# Patient Record
Sex: FEMALE | Race: BLACK OR AFRICAN AMERICAN | Employment: UNEMPLOYED | URBAN - METROPOLITAN AREA
[De-identification: names, ages, dates, MRNs, and addresses within clinical notes are randomized per-mention and may not be internally consistent; named-entity substitution may affect disease eponyms.]

---

## 2017-10-27 ENCOUNTER — HOSPITAL ENCOUNTER (EMERGENCY)
Age: 37
Discharge: HOME | End: 2017-10-27
Attending: EMERGENCY MEDICINE
Payer: MEDICAID

## 2017-10-27 VITALS
HEIGHT: 62 IN | DIASTOLIC BLOOD PRESSURE: 88 MMHG | SYSTOLIC BLOOD PRESSURE: 164 MMHG | OXYGEN SATURATION: 98 % | RESPIRATION RATE: 16 BRPM | BODY MASS INDEX: 20.57 KG/M2 | HEART RATE: 96 BPM | TEMPERATURE: 99 F | WEIGHT: 111.77 LBS

## 2017-10-27 DIAGNOSIS — R20.2 PARESTHESIA: Primary | ICD-10-CM

## 2017-10-27 PROCEDURE — 99281 EMR DPT VST MAYX REQ PHY/QHP: CPT

## 2017-10-27 NOTE — ED TRIAGE NOTES
Pt reports she woke up with left sided arm numbness this morning and started with left facial numbness at 0700 am today that has been resolved and lasted approx 1-2 minutes.  Dr. Jeffrey Jim states he will see patient first before calling a CODE S.

## 2017-10-27 NOTE — DISCHARGE INSTRUCTIONS
Numbness and Tingling: Care Instructions  Your Care Instructions    Many things can cause numbness or tingling. Swelling may put pressure on a nerve. This could cause you to lose feeling or have a pins-and-needles sensation on part of your body. Nerves may be damaged from trauma, toxins, or diseases, such as diabetes or multiple sclerosis (MS). Sometimes, though, the cause is not clear. If there is no clear reason for your symptoms, and you are not having any other symptoms, your doctor may suggest watching and waiting for a while to see if the numbness or tingling goes away on its own. Your doctor may want you to have blood or nerve tests to find the cause of your symptoms. Follow-up care is a key part of your treatment and safety. Be sure to make and go to all appointments, and call your doctor if you are having problems. It's also a good idea to know your test results and keep a list of the medicines you take. How can you care for yourself at home? · If your doctor prescribes medicine, take it exactly as directed. Call your doctor if you think you are having a problem with your medicine. · If you have any swelling, put ice or a cold pack on the area for 10 to 20 minutes at a time. Put a thin cloth between the ice and your skin. When should you call for help? Call 911 anytime you think you may need emergency care. For example, call if:  ? · You have weakness, numbness, or tingling in both legs. ? · You lose bowel or bladder control. ? · You have symptoms of a stroke. These may include:  ¨ Sudden numbness, tingling, weakness, or loss of movement in your face, arm, or leg, especially on only one side of your body. ¨ Sudden vision changes. ¨ Sudden trouble speaking. ¨ Sudden confusion or trouble understanding simple statements. ¨ Sudden problems with walking or balance. ¨ A sudden, severe headache that is different from past headaches. ? Watch closely for changes in your health, and be sure to contact your doctor if you have any problems, or if:  ? · You do not get better as expected. Where can you learn more? Go to http://michele-mikayla.info/. Enter N856 in the search box to learn more about \"Numbness and Tingling: Care Instructions. \"  Current as of: October 14, 2016  Content Version: 11.4  © 3943-2877 [employer]. Care instructions adapted under license by [employer] (which disclaims liability or warranty for this information). If you have questions about a medical condition or this instruction, always ask your healthcare professional. [place] [address] any warranty or liability for your use of this information.

## 2017-10-27 NOTE — ED PROVIDER NOTES
HPI Comments: 42 y.o. female with past medical history significant for cough, GERD, postpartum depression, anemia, herpes simplex, preeclampsia, and asthma who presents to the ED with chief complaint of numbness. Patient reports left arm numbness that lasted for ~1-2 minutes with onset at ~0700 \"this morning,\" reports waking up with her symptoms. Patient reports developing left-sided facial numbness and tongue numbness after waking up \"this morning. \" Patient reports her symptoms have improved since onset. Patient reports visual changes to her right upper field of vision in her bilateral eyes ~2 days ago; describes her visual changes as \"like a kaleidoscope. \" Patient reports a right-sided headache that lasted for ~3-4 hours following the onset of her visual changes. Patient reports checking her blood pressure at that time; reports it was \"583\" systolic. Patient reports her systolic blood pressure improved to \"124\" after drinking a cup of herbal tea. Patient reports using herbal tumeric regularly for low TSH levels. Patient reports giving birth \"last year,\" reports nursing her child regularly. Patient reports a history of HTN during her pregnancy. Patient reports a history of migraines. Patient reports a history of being born with a heart murmur; reports it has since resolved. Patient denies any recent travel. Patient denies weakness, speech difficulty, and walking difficulty. There are no other acute medical concerns at this time. PCP: Arcelia Damon MD    Note written by Lynn Coats, as dictated by Dipti Foley MD 8:45 AM   The history is provided by the patient. Past Medical History:   Diagnosis Date    Abnormal Pap smear 2006    HPV +, cleared on subsequent pap    Abnormal Papanicolaou smear of cervix     2006. Repap was normal.    Anemia     takes iron    Asthma     related to GERD. Does not take an inhaler daily.     Cough     GERD (gastroesophageal reflux disease)     Herpes simplex without mention of complication     Postpartum depression     Didn't take her zoloft    Preeclampsia     with all previous pregnancies       Past Surgical History:   Procedure Laterality Date    HX DILATION AND CURETTAGE  2011    HX DILATION AND CURETTAGE  2011    following miscarriage    HX OTHER SURGICAL      D&C in 2011         Family History:   Problem Relation Age of Onset    Diabetes Mother     Hypertension Mother     Hypertension Father        Social History     Social History    Marital status:      Spouse name: N/A    Number of children: N/A    Years of education: N/A     Occupational History    Not on file. Social History Main Topics    Smoking status: Never Smoker    Smokeless tobacco: Never Used    Alcohol use No    Drug use: No    Sexual activity: Yes     Partners: Male     Other Topics Concern    Not on file     Social History Narrative         ALLERGIES: Latex and Latex, natural rubber    Review of Systems   Constitutional: Negative. Negative for appetite change, fever and unexpected weight change. HENT: Negative. Negative for ear pain, hearing loss, nosebleeds, rhinorrhea, sore throat and trouble swallowing. Eyes: Positive for visual disturbance. Respiratory: Negative. Negative for cough, chest tightness and shortness of breath. Cardiovascular: Negative. Negative for chest pain and palpitations. Gastrointestinal: Negative. Negative for abdominal distention, abdominal pain, blood in stool and vomiting. Endocrine: Negative. Genitourinary: Negative for dysuria and hematuria. Musculoskeletal: Negative. Negative for back pain and myalgias. Skin: Negative. Negative for rash. Allergic/Immunologic: Negative. Neurological: Positive for numbness and headaches. Negative for dizziness, syncope and weakness. Hematological: Negative. Psychiatric/Behavioral: Negative. All other systems reviewed and are negative.       Vitals:    10/27/17 0830   BP: 164/88   Pulse: 96   Resp: 16   Temp: 99 °F (37.2 °C)   SpO2: 98%   Weight: 50.7 kg (111 lb 12.4 oz)   Height: 5' 2\" (1.575 m)            Physical Exam   Constitutional: She is oriented to person, place, and time. She appears well-developed and well-nourished. No distress. HENT:   Head: Normocephalic and atraumatic. Right Ear: External ear normal.   Left Ear: External ear normal.   Nose: Nose normal.   Mouth/Throat: Oropharynx is clear and moist.   Eyes: Conjunctivae and EOM are normal. Pupils are equal, round, and reactive to light. Neck: Normal range of motion. Neck supple. No JVD present. No thyromegaly present. Cardiovascular: Normal rate, regular rhythm, normal heart sounds and intact distal pulses. No murmur heard. Pulmonary/Chest: Effort normal and breath sounds normal. No respiratory distress. She has no wheezes. She has no rales. Abdominal: Soft. Bowel sounds are normal. She exhibits no distension. There is no tenderness. Musculoskeletal: Normal range of motion. She exhibits no edema. Neurological: She is alert and oriented to person, place, and time. No cranial nerve deficit. Skin: Skin is warm and dry. No rash noted. Psychiatric: She has a normal mood and affect. Her behavior is normal. Thought content normal.   Note written by Lynn Mendoza, as dictated by Arya Armendariz MD 8:48 AM     MDM  Number of Diagnoses or Management Options  Paresthesia:   Diagnosis management comments: Resolved paraesthesia. No suspicion for central neurologic process, TIA, or CVA. Will discharge home with PCP follow up as needed and instructions to return if symptoms worsen.      ED Course       Procedures

## 2022-01-28 LAB
CHLAMYDIA, EXTERNAL: NEGATIVE
N. GONORRHEA, EXTERNAL: NEGATIVE

## 2022-02-24 LAB
ANTIBODY SCREEN, EXTERNAL: NEGATIVE
HBSAG, EXTERNAL: NEGATIVE
HIV, EXTERNAL: NEGATIVE
RPR, EXTERNAL: NORMAL
RUBELLA, EXTERNAL: NORMAL
TYPE, ABO & RH, EXTERNAL: NORMAL

## 2022-05-03 ENCOUNTER — HOSPITAL ENCOUNTER (OUTPATIENT)
Dept: PERINATAL CARE | Age: 42
Discharge: HOME | End: 2022-05-03
Attending: OBSTETRICS & GYNECOLOGY
Payer: MEDICAID

## 2022-05-03 PROCEDURE — 76811 OB US DETAILED SNGL FETUS: CPT | Performed by: OBSTETRICS & GYNECOLOGY

## 2022-06-14 ENCOUNTER — HOSPITAL ENCOUNTER (OUTPATIENT)
Dept: PERINATAL CARE | Age: 42
Discharge: HOME | End: 2022-06-14
Attending: OBSTETRICS & GYNECOLOGY
Payer: MEDICAID

## 2022-06-14 PROCEDURE — 76816 OB US FOLLOW-UP PER FETUS: CPT | Performed by: OBSTETRICS & GYNECOLOGY

## 2022-08-01 ENCOUNTER — HOSPITAL ENCOUNTER (OUTPATIENT)
Dept: PERINATAL CARE | Age: 42
Discharge: HOME | End: 2022-08-01
Attending: OBSTETRICS & GYNECOLOGY
Payer: MEDICAID

## 2022-08-01 PROCEDURE — 76816 OB US FOLLOW-UP PER FETUS: CPT | Performed by: OBSTETRICS & GYNECOLOGY

## 2022-08-11 LAB — GRBS, EXTERNAL: NEGATIVE

## 2022-08-17 ENCOUNTER — HOSPITAL ENCOUNTER (EMERGENCY)
Age: 42
Discharge: LEFT BEFORE DISCHARGE | End: 2022-08-17
Payer: MEDICAID

## 2022-08-17 PROCEDURE — 75810000275 HC EMERGENCY DEPT VISIT NO LEVEL OF CARE

## 2022-08-18 ENCOUNTER — ANESTHESIA (OUTPATIENT)
Dept: LABOR AND DELIVERY | Age: 42
DRG: 560 | End: 2022-08-18
Payer: MEDICAID

## 2022-08-18 ENCOUNTER — HOSPITAL ENCOUNTER (INPATIENT)
Age: 42
LOS: 2 days | Discharge: HOME | DRG: 560 | End: 2022-08-20
Attending: OBSTETRICS & GYNECOLOGY | Admitting: OBSTETRICS & GYNECOLOGY
Payer: MEDICAID

## 2022-08-18 ENCOUNTER — ANESTHESIA EVENT (OUTPATIENT)
Dept: LABOR AND DELIVERY | Age: 42
DRG: 560 | End: 2022-08-18
Payer: MEDICAID

## 2022-08-18 PROBLEM — Z3A.37 37 WEEKS GESTATION OF PREGNANCY: Status: ACTIVE | Noted: 2022-08-18

## 2022-08-18 LAB
ABO + RH BLD: NORMAL
ALBUMIN SERPL-MCNC: 2.6 G/DL (ref 3.5–5)
ALBUMIN/GLOB SERPL: 0.8 {RATIO} (ref 1.1–2.2)
ALP SERPL-CCNC: 162 U/L (ref 45–117)
ALT SERPL-CCNC: 20 U/L (ref 12–78)
ANION GAP SERPL CALC-SCNC: 8 MMOL/L (ref 5–15)
AST SERPL-CCNC: 21 U/L (ref 15–37)
BILIRUB SERPL-MCNC: 0.4 MG/DL (ref 0.2–1)
BLOOD GROUP ANTIBODIES SERPL: NORMAL
BUN SERPL-MCNC: 6 MG/DL (ref 6–20)
BUN/CREAT SERPL: 11 (ref 12–20)
CALCIUM SERPL-MCNC: 8.5 MG/DL (ref 8.5–10.1)
CHLORIDE SERPL-SCNC: 111 MMOL/L (ref 97–108)
CO2 SERPL-SCNC: 21 MMOL/L (ref 21–32)
CREAT SERPL-MCNC: 0.57 MG/DL (ref 0.55–1.02)
CREAT UR-MCNC: 47 MG/DL
ERYTHROCYTE [DISTWIDTH] IN BLOOD BY AUTOMATED COUNT: 15 % (ref 11.5–14.5)
GLOBULIN SER CALC-MCNC: 3.1 G/DL (ref 2–4)
GLUCOSE SERPL-MCNC: 68 MG/DL (ref 65–100)
HCT VFR BLD AUTO: 33.6 % (ref 35–47)
HGB BLD-MCNC: 10.6 G/DL (ref 11.5–16)
LDH SERPL L TO P-CCNC: 179 U/L (ref 81–246)
MCH RBC QN AUTO: 24.7 PG (ref 26–34)
MCHC RBC AUTO-ENTMCNC: 31.5 G/DL (ref 30–36.5)
MCV RBC AUTO: 78.1 FL (ref 80–99)
NRBC # BLD: 0 K/UL (ref 0–0.01)
NRBC BLD-RTO: 0 PER 100 WBC
PLATELET # BLD AUTO: 160 K/UL (ref 150–400)
PMV BLD AUTO: 11.4 FL (ref 8.9–12.9)
POTASSIUM SERPL-SCNC: 3.9 MMOL/L (ref 3.5–5.1)
PROT SERPL-MCNC: 5.7 G/DL (ref 6.4–8.2)
PROT UR-MCNC: 30 MG/DL (ref 0–11.9)
PROT/CREAT UR-RTO: 0.6
RBC # BLD AUTO: 4.3 M/UL (ref 3.8–5.2)
SODIUM SERPL-SCNC: 140 MMOL/L (ref 136–145)
SPECIMEN EXP DATE BLD: NORMAL
WBC # BLD AUTO: 8 K/UL (ref 3.6–11)

## 2022-08-18 PROCEDURE — 75410000003 HC RECOV DEL/VAG/CSECN EA 0.5 HR: Performed by: STUDENT IN AN ORGANIZED HEALTH CARE EDUCATION/TRAINING PROGRAM

## 2022-08-18 PROCEDURE — 75410000000 HC DELIVERY VAGINAL/SINGLE: Performed by: STUDENT IN AN ORGANIZED HEALTH CARE EDUCATION/TRAINING PROGRAM

## 2022-08-18 PROCEDURE — 74011250637 HC RX REV CODE- 250/637: Performed by: OBSTETRICS & GYNECOLOGY

## 2022-08-18 PROCEDURE — 84156 ASSAY OF PROTEIN URINE: CPT

## 2022-08-18 PROCEDURE — 36415 COLL VENOUS BLD VENIPUNCTURE: CPT

## 2022-08-18 PROCEDURE — 85027 COMPLETE CBC AUTOMATED: CPT

## 2022-08-18 PROCEDURE — 80053 COMPREHEN METABOLIC PANEL: CPT

## 2022-08-18 PROCEDURE — 74011000250 HC RX REV CODE- 250: Performed by: ANESTHESIOLOGY

## 2022-08-18 PROCEDURE — 77030014125 HC TY EPDRL BBMI -B: Performed by: ANESTHESIOLOGY

## 2022-08-18 PROCEDURE — 74011250636 HC RX REV CODE- 250/636: Performed by: OBSTETRICS & GYNECOLOGY

## 2022-08-18 PROCEDURE — 74011250636 HC RX REV CODE- 250/636: Performed by: ANESTHESIOLOGY

## 2022-08-18 PROCEDURE — 83615 LACTATE (LD) (LDH) ENZYME: CPT

## 2022-08-18 PROCEDURE — 75810000275 HC EMERGENCY DEPT VISIT NO LEVEL OF CARE

## 2022-08-18 PROCEDURE — 77030005513 HC CATH URETH FOL11 MDII -B

## 2022-08-18 PROCEDURE — 86900 BLOOD TYPING SEROLOGIC ABO: CPT

## 2022-08-18 PROCEDURE — 75410000002 HC LABOR FEE PER 1 HR: Performed by: STUDENT IN AN ORGANIZED HEALTH CARE EDUCATION/TRAINING PROGRAM

## 2022-08-18 PROCEDURE — 74011250637 HC RX REV CODE- 250/637: Performed by: STUDENT IN AN ORGANIZED HEALTH CARE EDUCATION/TRAINING PROGRAM

## 2022-08-18 PROCEDURE — 65410000002 HC RM PRIVATE OB

## 2022-08-18 PROCEDURE — 76060000078 HC EPIDURAL ANESTHESIA: Performed by: ANESTHESIOLOGY

## 2022-08-18 PROCEDURE — 74011250636 HC RX REV CODE- 250/636

## 2022-08-18 PROCEDURE — 4A1HXCZ MONITORING OF PRODUCTS OF CONCEPTION, CARDIAC RATE, EXTERNAL APPROACH: ICD-10-PCS | Performed by: STUDENT IN AN ORGANIZED HEALTH CARE EDUCATION/TRAINING PROGRAM

## 2022-08-18 PROCEDURE — 2709999900 HC NON-CHARGEABLE SUPPLY

## 2022-08-18 RX ORDER — NALBUPHINE HYDROCHLORIDE 10 MG/ML
10 INJECTION, SOLUTION INTRAMUSCULAR; INTRAVENOUS; SUBCUTANEOUS
Status: DISCONTINUED | OUTPATIENT
Start: 2022-08-18 | End: 2022-08-19 | Stop reason: HOSPADM

## 2022-08-18 RX ORDER — IBUPROFEN 800 MG/1
800 TABLET ORAL
Status: DISCONTINUED | OUTPATIENT
Start: 2022-08-18 | End: 2022-08-20 | Stop reason: HOSPADM

## 2022-08-18 RX ORDER — NIFEDIPINE 30 MG/1
30 TABLET, EXTENDED RELEASE ORAL
Status: COMPLETED | OUTPATIENT
Start: 2022-08-18 | End: 2022-08-18

## 2022-08-18 RX ORDER — MAGNESIUM SULFATE HEPTAHYDRATE 40 MG/ML
2 INJECTION, SOLUTION INTRAVENOUS CONTINUOUS
Status: DISCONTINUED | OUTPATIENT
Start: 2022-08-18 | End: 2022-08-19

## 2022-08-18 RX ORDER — GUAIFENESIN 100 MG/5ML
81 LIQUID (ML) ORAL DAILY
COMMUNITY
End: 2022-08-20

## 2022-08-18 RX ORDER — MAGNESIUM SULFATE HEPTAHYDRATE 40 MG/ML
2 INJECTION, SOLUTION INTRAVENOUS ONCE
Status: DISCONTINUED | OUTPATIENT
Start: 2022-08-18 | End: 2022-08-18

## 2022-08-18 RX ORDER — MAGNESIUM SULFATE HEPTAHYDRATE 40 MG/ML
INJECTION, SOLUTION INTRAVENOUS
Status: COMPLETED
Start: 2022-08-18 | End: 2022-08-18

## 2022-08-18 RX ORDER — MINERAL OIL
120 OIL (ML) ORAL ONCE
Status: ACTIVE | OUTPATIENT
Start: 2022-08-18 | End: 2022-08-18

## 2022-08-18 RX ORDER — ACETAMINOPHEN 325 MG/1
650 TABLET ORAL
Status: DISCONTINUED | OUTPATIENT
Start: 2022-08-18 | End: 2022-08-18

## 2022-08-18 RX ORDER — MAGNESIUM SULFATE HEPTAHYDRATE 40 MG/ML
4 INJECTION, SOLUTION INTRAVENOUS ONCE
Status: COMPLETED | OUTPATIENT
Start: 2022-08-18 | End: 2022-08-18

## 2022-08-18 RX ORDER — FENTANYL CITRATE 50 UG/ML
INJECTION, SOLUTION INTRAMUSCULAR; INTRAVENOUS AS NEEDED
Status: DISCONTINUED | OUTPATIENT
Start: 2022-08-18 | End: 2022-08-18 | Stop reason: HOSPADM

## 2022-08-18 RX ORDER — ZOLPIDEM TARTRATE 5 MG/1
5 TABLET ORAL
Status: DISCONTINUED | OUTPATIENT
Start: 2022-08-18 | End: 2022-08-20 | Stop reason: HOSPADM

## 2022-08-18 RX ORDER — FENTANYL/BUPIVACAINE/NS/PF 2-1250MCG
10 PREFILLED PUMP RESERVOIR EPIDURAL CONTINUOUS
Status: DISCONTINUED | OUTPATIENT
Start: 2022-08-18 | End: 2022-08-19

## 2022-08-18 RX ORDER — MAG HYDROX/ALUMINUM HYD/SIMETH 200-200-20
30 SUSPENSION, ORAL (FINAL DOSE FORM) ORAL
Status: DISCONTINUED | OUTPATIENT
Start: 2022-08-18 | End: 2022-08-19 | Stop reason: HOSPADM

## 2022-08-18 RX ORDER — CALCIUM CARBONATE 200(500)MG
400 TABLET,CHEWABLE ORAL
Status: DISCONTINUED | OUTPATIENT
Start: 2022-08-18 | End: 2022-08-19 | Stop reason: HOSPADM

## 2022-08-18 RX ORDER — HYDROCORTISONE ACETATE PRAMOXINE HCL 2.5; 1 G/100G; G/100G
CREAM TOPICAL AS NEEDED
Status: DISCONTINUED | OUTPATIENT
Start: 2022-08-18 | End: 2022-08-18 | Stop reason: CLARIF

## 2022-08-18 RX ORDER — ACETAMINOPHEN 325 MG/1
650 TABLET ORAL
Status: DISCONTINUED | OUTPATIENT
Start: 2022-08-18 | End: 2022-08-20 | Stop reason: HOSPADM

## 2022-08-18 RX ORDER — CALCIUM GLUCONATE 20 MG/ML
1 INJECTION, SOLUTION INTRAVENOUS ONCE
Status: ACTIVE | OUTPATIENT
Start: 2022-08-18 | End: 2022-08-18

## 2022-08-18 RX ORDER — OXYTOCIN/RINGER'S LACTATE 30/500 ML
0-20 PLASTIC BAG, INJECTION (ML) INTRAVENOUS
Status: DISCONTINUED | OUTPATIENT
Start: 2022-08-18 | End: 2022-08-19

## 2022-08-18 RX ORDER — OXYTOCIN/RINGER'S LACTATE 30/500 ML
87.3 PLASTIC BAG, INJECTION (ML) INTRAVENOUS AS NEEDED
Status: DISCONTINUED | OUTPATIENT
Start: 2022-08-18 | End: 2022-08-20 | Stop reason: HOSPADM

## 2022-08-18 RX ORDER — NALOXONE HYDROCHLORIDE 0.4 MG/ML
0.4 INJECTION, SOLUTION INTRAMUSCULAR; INTRAVENOUS; SUBCUTANEOUS AS NEEDED
Status: DISCONTINUED | OUTPATIENT
Start: 2022-08-18 | End: 2022-08-20 | Stop reason: HOSPADM

## 2022-08-18 RX ORDER — LIDOCAINE HYDROCHLORIDE AND EPINEPHRINE 15; 5 MG/ML; UG/ML
INJECTION, SOLUTION EPIDURAL AS NEEDED
Status: DISCONTINUED | OUTPATIENT
Start: 2022-08-18 | End: 2022-08-18 | Stop reason: HOSPADM

## 2022-08-18 RX ORDER — EPHEDRINE SULFATE/0.9% NACL/PF 50 MG/5 ML
10 SYRINGE (ML) INTRAVENOUS
Status: DISCONTINUED | OUTPATIENT
Start: 2022-08-18 | End: 2022-08-19 | Stop reason: HOSPADM

## 2022-08-18 RX ORDER — OXYTOCIN/RINGER'S LACTATE 30/500 ML
10 PLASTIC BAG, INJECTION (ML) INTRAVENOUS AS NEEDED
Status: DISCONTINUED | OUTPATIENT
Start: 2022-08-18 | End: 2022-08-20 | Stop reason: HOSPADM

## 2022-08-18 RX ORDER — HYDRALAZINE HYDROCHLORIDE 20 MG/ML
10 INJECTION INTRAMUSCULAR; INTRAVENOUS ONCE
Status: COMPLETED | OUTPATIENT
Start: 2022-08-19 | End: 2022-08-18

## 2022-08-18 RX ORDER — SODIUM CHLORIDE, SODIUM LACTATE, POTASSIUM CHLORIDE, CALCIUM CHLORIDE 600; 310; 30; 20 MG/100ML; MG/100ML; MG/100ML; MG/100ML
75 INJECTION, SOLUTION INTRAVENOUS CONTINUOUS
Status: DISCONTINUED | OUTPATIENT
Start: 2022-08-18 | End: 2022-08-19

## 2022-08-18 RX ORDER — ONDANSETRON 2 MG/ML
4 INJECTION INTRAMUSCULAR; INTRAVENOUS
Status: DISCONTINUED | OUTPATIENT
Start: 2022-08-18 | End: 2022-08-19 | Stop reason: HOSPADM

## 2022-08-18 RX ORDER — NIFEDIPINE 30 MG/1
30 TABLET, EXTENDED RELEASE ORAL EVERY 12 HOURS
Status: DISCONTINUED | OUTPATIENT
Start: 2022-08-18 | End: 2022-08-20 | Stop reason: HOSPADM

## 2022-08-18 RX ADMIN — MAGNESIUM SULFATE HEPTAHYDRATE 2 G/HR: 40 INJECTION, SOLUTION INTRAVENOUS at 20:28

## 2022-08-18 RX ADMIN — FENTANYL CITRATE 25 MCG: 50 INJECTION, SOLUTION INTRAMUSCULAR; INTRAVENOUS at 11:14

## 2022-08-18 RX ADMIN — SODIUM CHLORIDE, POTASSIUM CHLORIDE, SODIUM LACTATE AND CALCIUM CHLORIDE 125 ML/HR: 600; 310; 30; 20 INJECTION, SOLUTION INTRAVENOUS at 08:12

## 2022-08-18 RX ADMIN — SODIUM CHLORIDE, POTASSIUM CHLORIDE, SODIUM LACTATE AND CALCIUM CHLORIDE 999 ML/HR: 600; 310; 30; 20 INJECTION, SOLUTION INTRAVENOUS at 16:34

## 2022-08-18 RX ADMIN — NIFEDIPINE 30 MG: 30 TABLET, EXTENDED RELEASE ORAL at 08:21

## 2022-08-18 RX ADMIN — LIDOCAINE HYDROCHLORIDE AND EPINEPHRINE 3.5 ML: 15; 5 INJECTION, SOLUTION EPIDURAL at 11:21

## 2022-08-18 RX ADMIN — SODIUM CHLORIDE, POTASSIUM CHLORIDE, SODIUM LACTATE AND CALCIUM CHLORIDE 75 ML/HR: 600; 310; 30; 20 INJECTION, SOLUTION INTRAVENOUS at 15:05

## 2022-08-18 RX ADMIN — SODIUM CHLORIDE, POTASSIUM CHLORIDE, SODIUM LACTATE AND CALCIUM CHLORIDE 75 ML/HR: 600; 310; 30; 20 INJECTION, SOLUTION INTRAVENOUS at 11:21

## 2022-08-18 RX ADMIN — SODIUM CHLORIDE, POTASSIUM CHLORIDE, SODIUM LACTATE AND CALCIUM CHLORIDE 999 ML/HR: 600; 310; 30; 20 INJECTION, SOLUTION INTRAVENOUS at 10:20

## 2022-08-18 RX ADMIN — OXYTOCIN 4 MILLI-UNITS/MIN: 10 INJECTION, SOLUTION INTRAMUSCULAR; INTRAVENOUS at 09:11

## 2022-08-18 RX ADMIN — NIFEDIPINE 30 MG: 30 TABLET, EXTENDED RELEASE ORAL at 07:22

## 2022-08-18 RX ADMIN — SODIUM CHLORIDE, POTASSIUM CHLORIDE, SODIUM LACTATE AND CALCIUM CHLORIDE 999 ML/HR: 600; 310; 30; 20 INJECTION, SOLUTION INTRAVENOUS at 10:05

## 2022-08-18 RX ADMIN — Medication 10 ML/HR: at 19:16

## 2022-08-18 RX ADMIN — HYDRALAZINE HYDROCHLORIDE 10 MG: 20 INJECTION INTRAMUSCULAR; INTRAVENOUS at 23:53

## 2022-08-18 RX ADMIN — SODIUM CHLORIDE, POTASSIUM CHLORIDE, SODIUM LACTATE AND CALCIUM CHLORIDE 125 ML/HR: 600; 310; 30; 20 INJECTION, SOLUTION INTRAVENOUS at 23:58

## 2022-08-18 RX ADMIN — MAGNESIUM SULFATE HEPTAHYDRATE 4 G: 40 INJECTION, SOLUTION INTRAVENOUS at 09:09

## 2022-08-18 RX ADMIN — NIFEDIPINE 30 MG: 30 TABLET, FILM COATED, EXTENDED RELEASE ORAL at 21:46

## 2022-08-18 RX ADMIN — MAGNESIUM SULFATE HEPTAHYDRATE 2 G/HR: 40 INJECTION, SOLUTION INTRAVENOUS at 09:32

## 2022-08-18 RX ADMIN — FENTANYL CITRATE 75 MCG: 50 INJECTION, SOLUTION INTRAMUSCULAR; INTRAVENOUS at 11:21

## 2022-08-18 RX ADMIN — ONDANSETRON 4 MG: 2 INJECTION INTRAMUSCULAR; INTRAVENOUS at 18:16

## 2022-08-18 RX ADMIN — ACETAMINOPHEN 650 MG: 325 TABLET ORAL at 22:49

## 2022-08-18 RX ADMIN — OXYTOCIN 2 MILLI-UNITS/MIN: 10 INJECTION, SOLUTION INTRAMUSCULAR; INTRAVENOUS at 08:36

## 2022-08-18 RX ADMIN — Medication 10 ML/HR: at 11:50

## 2022-08-18 NOTE — PROGRESS NOTES
Delayed note entry due to patient care -- patient evaluated, feeling occasional pelvic pressure with contractions. VE 8-9/80/0. Category I FHT. On pitocin. Continue current management, anticipate vaginal delivery.

## 2022-08-18 NOTE — PROGRESS NOTES
190: Bedside and Verbal shift change report given to JERRI King RN (oncoming nurse) by Kalia Billy RN(offgoing nurse). Report included the following information SBAR, MAR, Recent Results, and Quality Measures. : Patient actively pushing. RN remains in continuous attendance at the bedside. Assessment & evaluation of fetal heart rate ongoing via continuous EFM. :  of viable female infant by Sidra Christianson MD.   Delivery QBL: 50 ml    : VORB to continue Procardia BID and continue magnesium infusion for 24 hours PP.    2322: This RN notified Jason Atkinson MD of patient borderline elevated blood pressure readings. No new orders at this time. 2339: This RN alerting Shawna GARCIA of patient elevated BP. VORB for 10mg hydralazine IV.    25: Bedside and Verbal shift change report given to FAYE Vickers RN (oncoming nurse) by Karolyn Soliman RN (offgoing nurse). Report included the following information SBAR, Intake/Output, MAR, Recent Results, and Quality Measures.

## 2022-08-18 NOTE — PROGRESS NOTES
Labor Note    Rosemary Cadet  [id number]  1980  G17 [id number] 37w1d      S:  Feeling comfortable with epidural    O:    Visit Vitals  BP (!) 140/79   Pulse 97   Temp 97.9 °F (36.6 °C)   Resp 15   Ht 5' 3\" (1.6 m)   Wt 62.1 kg (137 lb)   SpO2 95%   Breastfeeding No   BMI 24.27 kg/m²      4/50/-2, AROM clear     Patient Vitals for the past 4 hrs: Mode Fetal Heart Rate Variability Decelerations Accelerations RN Reviewed Strip? Non Stress Test   22 1115 -- -- -- -- -- Yes --   22 1100 External 120 6-25 BPM None Yes Yes Reactive   22 1045 -- -- -- -- -- Yes --   22 1030 External 125 6-25 BPM None Yes Yes Reactive   22 1015 -- -- -- -- -- Yes --   22 1000 External 125 6-25 BPM None Yes Yes --   22 0945 -- -- -- -- -- Yes --   22 0930 External 125 6-25 BPM None Yes Yes Reactive   22 0915 -- -- -- -- -- Yes --       A/P:  38 y.o. G17 [id number] @ 37w1d - labor , preE  1. CEFM/[place]  2. GBS neg / Rh+  3. Pitocin per protocol   4. Pain control - epidural   5. Roya prn. Expect .       Brian Sylvester MD  [facility]

## 2022-08-18 NOTE — PROGRESS NOTES
[id number] Monahan MD notified of elevated pressures x2, VORB give 30mg procardia XL now   0745: Patient in agreement for delivery, labs drawn, assessment completed, Adolfo Farias MD at bedside. [id number] Ramirez MD at bedside, SVE 3cm, start pitocin, patient may have epidural when desired. Patient declines AROM until epidural placement. 0815: BP remains elevated over threshold, second dose of procardia given per order  0845: Alicia Flowers MD notified of continued elevated pressures, per MD start mag sulfate now. 0900: Patient up to restroom, RN at bedside to initiate magnesium sulfate  0910: RN remains at bedside throughout admin of mag sulfate bolus. 1010: Patient resting in bed with eyes closed, epidural bolus started  1100: Melissa Jeong MD at bedside for epidural placement. 1145: Patient continues resting in bed, no discomfort felt from contractions. 1230: Jona GARCIA at bedside for SROM and SVE  1330: Patient continues resting in bed, left side, with peanut ball between legs. 1500: Patient rotated to lateral right. 1625: patient in t-moreno  1700: Patient taken out of t-moreno  1800: Patient continues feeling intermittent pressure. 1905: SBAR report given to Fernando Rivera RN, transfer of patient care complete at this time.

## 2022-08-18 NOTE — PROGRESS NOTES
8/18/2022  10:40 AM    CM met with BUFFY to complete initial assessment and begin discharge planning. MOB verified and confirmed demographics. BUFFY lives with VERITO Meza ( [phone]), along with her children ages: 15,15,6, and 6, at the address on file. BUFFY is not employed and plans to be home with infant. FODONALDO is employed and will be taking time off. BUFFY reports she has good family support, and feels like she has the support she needs when she returns home. BUFFY plans to breast feed baby and would like to order a pump to use at home. [facility] will provide follow up care for infant. BUFFY has car seat, bassinet/crib, clothing, bottles and all necessary supplies for baby. BUFFY has [employer] Medicaid, and will be adding baby to this policy. CM discussed process to add baby to insurance, MOB verbalized understanding. CM assisted BUFFY in ordering pump through insurance. Order for [address] submitted via [place] [employer]  Care Management Interventions  PCP Verified by CM: Yes Zay Perez)  Mode of Transport at Discharge:  Other (see comment)  Transition of Care Consult (CM Consult): Discharge Planning  Support Systems: Other Family Member(s), Spouse/Significant Other  Confirm Follow Up Transport: Family  Discharge Location  Patient Expects to be Discharged to[de-identified] Home with family assistance  Stan Muniz

## 2022-08-18 NOTE — ANESTHESIA PREPROCEDURE EVALUATION
Relevant Problems   No relevant active problems       Anesthetic History               Review of Systems / Medical History  Patient summary reviewed and nursing notes reviewed    Pulmonary                   Neuro/Psych   Within defined limits           Cardiovascular    Hypertension: poorly controlled              Exercise tolerance: >4 METS     GI/Hepatic/Renal                Endo/Other  Within defined limits           Other Findings              Physical Exam    Airway  Mallampati: I    Neck ROM: normal range of motion   Mouth opening: Normal     Cardiovascular    Rhythm: regular  Rate: normal         Dental  No notable dental hx       Pulmonary  Breath sounds clear to auscultation               Abdominal         Other Findings            Anesthetic Plan    ASA: 3  Anesthesia type: epidural and spinal            Anesthetic plan and risks discussed with: Patient      Informed consent obtained.

## 2022-08-18 NOTE — PROGRESS NOTES
0645: Pt arrived to L&D reporting painful contractions every 5 minutes. Pt stated that her pain is a 4/10. Pt denies VB, leakage of fluid, and endorses good fetal movement. Pt reports a history of Pre-eclampsia but denies symptoms at this time. 5503: /106, HR 93. Will repeat in 15 minutes.     0710: Shift report given to [facility]

## 2022-08-18 NOTE — H&P
History & Physical    Name: Khari Tucker MRN: [id number]  SSN: [id number]    YOB: 1980  Age: 38 y.o. Sex: female        Subjective:   Chief Complaint: Hypertension  Estimated Date of Delivery: 22  OB History    Para Term  AB Living   17 4 2 2 12 4   SAB IAB Ectopic Molar Multiple Live Births   8       0 4      # Outcome Date GA Lbr Anatoly/2nd Weight Sex Delivery Anes PTL Lv   17 Current            16 Term 16 39w0d  3.337 kg F VAGINAL DELI EPIDURAL AN N WILLIAM   15  14 36w0d  2.778 kg F Vag-Spont   WILLIAM      Complications: Preeclampsia   14 AB 2013 6w0d    SAB   DEC   13 AB 2012 6w0d    SAB   DEC   12 AB 2011 10w0d    SAB   DEC      Birth Comments: missed , d&c   11  2009 36w0d  2.41 kg F INDUCTION   WILLIAM      Birth Comments: Preeclampsia, hyperemisis gravitum, dates may have been off - looked like a 40 week fetus   10 SAB 2009 6w0d    SAB      9 AB 2008 6w0d    SAB   DEC   8 Term 12/15/06 38w0d  2.665 kg F INDUCTION  N WILLIAM      Birth Comments: Pre eclampsia       Complications: Preeclampsia   7 SAB            6 SAB            5 SAB            4 SAB            3 SAB            2 SAB            1 SAB               Obstetric Comments   Pt.  has had preeclampsia with all pregnancies.  was treated with Magnesium sulfate with each pregnancy. Khari Tucker, 38 y.o.,  ,  presents at 37w1d, complaining of Hypertension. She came last night, and left without being seen,  for systolic hypertension, systolic BP  in the 770'L. She returns complaining of contractions. She does have right upper quadrant abdominal pain. She denies headache, visual changes, chest pain, shortness of breath, nausea, and vomiting. Prenatal course was normal. Please see prenatal records for details.     Allergies   Allergen Reactions    Latex Contact Dermatitis    Latex, Natural Rubber Contact Dermatitis       Prior to Admission medications Medication Sig Start Date End Date Taking? Authorizing Provider   aspirin 81 mg chewable tablet Take 81 mg by mouth daily. Yes Provider, Historical   ferrous sulfate (IRON) 325 mg (65 mg iron) tablet Take 1 Tab by mouth two (2) times a day. 14  Yes José Piedra MD   prenatal multivit-ca-min-fe-fa tab Take  by mouth. Yes Provider, Historical       Past Medical History:   Diagnosis Date    Abnormal Pap smear     HPV +, cleared on subsequent pap    Abnormal Papanicolaou smear of cervix     . Repap was normal.    Anemia     takes iron    Asthma     related to GERD. Does not take an inhaler daily. Cough     GERD (gastroesophageal reflux disease)     Herpes simplex without mention of complication     Postpartum depression     Didn't take her zoloft    Preeclampsia     with all previous pregnancies       Past Surgical History:   Procedure Laterality Date    HX DILATION AND CURETTAGE      HX DILATION AND CURETTAGE      following miscarriage    HX OTHER SURGICAL      D&C in        Social History     Occupational History    Not on file   Tobacco Use    Smoking status: Never    Smokeless tobacco: Never   Vaping Use    Vaping Use: Never used   Substance and Sexual Activity    Alcohol use: No    Drug use: No    Sexual activity: Yes     Partners: Male       Family History   Problem Relation Age of Onset    Diabetes Mother     Hypertension Mother     Hypertension Father        Review of Systems   All other systems reviewed and are negative. Objective:     Physical Exam:    Visit Vitals  BP (!) 177/106   Pulse 93   Temp 98.6 °F (37 °C)   Resp 16   Ht 5' 3\" (1.6 m)   Wt 62.1 kg (137 lb)   SpO2 100%   BMI 24.27 kg/m²       General:   38 y.o.  female who appears uncomfortable with contractions. HEENT:   Normocephalic. Pupils are equal, round, and reactive to light. Extraocular movements are intact. Pharynx is clear. Neck:   Normal range of motion. No thyromegaly. Heart:   Regular rate and rhythm. No murmurs present. Lungs:   Clear, no rales, rhonchi, or wheezes. Abdomen:   Soft, gravid, not tender, normal bowel sounds. No CVA tenderness   Leopold's: Vertex   EFW 7 - 8 pounds   Uterine Activity:    Frequency: Every 2 - 4 minutes    Duration: 60 seconds    Intensity: Mild  Fetal Heart Rate:     Baseline: 130 bpm    Variability: Moderate    Accelerations: Present (15 x 15 bpm)    Decelerations: None  Category: Category 1  Pelvic:    Membranes: Intact   Cervical Exam: Not done yet  Extremites:   Trace bilateral pedal edema. Full range of motion. Neuro:    Alert. Oriented. CN 2 - 12 intact. Motor and sensory exam grossly normal.      Prenatal Labs   Lab Results   Component Value Date/Time    Rubella, External Immune (5.0) 01/19/2016 12:00 AM    GrBStrep, External Negative 04/15/2016 12:00 AM    HBsAg, External Negative 01/19/2016 12:00 AM    HIV, External Negative 01/19/2016 12:00 AM    Gonorrhea, External Negative 01/19/2016 12:00 AM    Chlamydia, External Negative 01/19/2016 12:00 AM    ABO,Rh B Positive 12/06/2013 12:00 AM     No results found for this or any previous visit (from the past 12 hour(s)).       Assessment/Plan:     Active Hospital Problems    Diagnosis Date Noted    Severe pre-eclampsia affecting childbirth 08/18/2022     Priority: 1 - One    37 weeks gestation of pregnancy 08/18/2022     Priority: 2 - Two       Nifedipine xl 30 po now for hypertension, repeat in 1 hour if sys>149 or salguero > 99  Admit for L&D      Gabriel Brothers MD  8/18/2022

## 2022-08-18 NOTE — PROGRESS NOTES
Labor Note    Chey Ramos  [id number]  1980  G17 [id number] 37w1d      S:  Came in this AM for contractions. Noted to have severe range blood pressures. O:    Visit Vitals  BP (!) 168/94   Pulse 80   Temp 98.4 °F (36.9 °C)   Resp 16   Ht 5' 3\" (1.6 m)   Wt 62.1 kg (137 lb)   SpO2 99%   Breastfeeding No   BMI 24.27 kg/m²      Cervix 3/50/-2    Patient Vitals for the past 4 hrs: Mode Fetal Heart Rate Variability Decelerations Accelerations RN Reviewed Strip? Non Stress Test   08/18/22 0717 External 130 6-25 BPM None Yes Yes Reactive       A/P:  38 y.o. G17 [id number] @ 37w1d- IOL     - suspect severe preE-- labs pending. Received 1 dose of po meds. Will continue nifedipine 30mg xl q12hrs.  Mag if worsening pressures, abn labs or headache  - cervix 3cm--start pitocin then arom as desires epidural first  - gbs neg    Ronald Gaston MD  [facility]

## 2022-08-19 PROCEDURE — 65270000029 HC RM PRIVATE

## 2022-08-19 PROCEDURE — 74011250637 HC RX REV CODE- 250/637: Performed by: STUDENT IN AN ORGANIZED HEALTH CARE EDUCATION/TRAINING PROGRAM

## 2022-08-19 PROCEDURE — 74011250637 HC RX REV CODE- 250/637: Performed by: OBSTETRICS & GYNECOLOGY

## 2022-08-19 PROCEDURE — 74011250636 HC RX REV CODE- 250/636: Performed by: OBSTETRICS & GYNECOLOGY

## 2022-08-19 RX ORDER — OXYTOCIN/RINGER'S LACTATE 30/500 ML
10 PLASTIC BAG, INJECTION (ML) INTRAVENOUS AS NEEDED
Status: DISCONTINUED | OUTPATIENT
Start: 2022-08-19 | End: 2022-08-20

## 2022-08-19 RX ORDER — SODIUM CHLORIDE 0.9 % (FLUSH) 0.9 %
5-40 SYRINGE (ML) INJECTION EVERY 8 HOURS
Status: DISCONTINUED | OUTPATIENT
Start: 2022-08-20 | End: 2022-08-20

## 2022-08-19 RX ORDER — OXYTOCIN/RINGER'S LACTATE 30/500 ML
87.3 PLASTIC BAG, INJECTION (ML) INTRAVENOUS AS NEEDED
Status: DISCONTINUED | OUTPATIENT
Start: 2022-08-19 | End: 2022-08-20

## 2022-08-19 RX ORDER — SODIUM CHLORIDE 0.9 % (FLUSH) 0.9 %
5-40 SYRINGE (ML) INJECTION AS NEEDED
Status: DISCONTINUED | OUTPATIENT
Start: 2022-08-19 | End: 2022-08-20

## 2022-08-19 RX ADMIN — NIFEDIPINE 30 MG: 30 TABLET, FILM COATED, EXTENDED RELEASE ORAL at 20:56

## 2022-08-19 RX ADMIN — MAGNESIUM SULFATE HEPTAHYDRATE 2 G/HR: 40 INJECTION, SOLUTION INTRAVENOUS at 06:43

## 2022-08-19 RX ADMIN — NIFEDIPINE 30 MG: 30 TABLET, FILM COATED, EXTENDED RELEASE ORAL at 08:46

## 2022-08-19 RX ADMIN — IBUPROFEN 800 MG: 800 TABLET, FILM COATED ORAL at 20:05

## 2022-08-19 RX ADMIN — IBUPROFEN 800 MG: 800 TABLET, FILM COATED ORAL at 05:28

## 2022-08-19 NOTE — PROGRESS NOTES
5800 Bedside and Verbal shift change report given to [address] [place] [address],Trav. 2800 (oncoming nurse) by Felicia Muniz RN (offgoing nurse). Report included the following information SBAR, Procedure Summary, Intake/Output, MAR, Recent Results, and Med Rec Status. 0720 Verified IV pump LR running at 75ml/hr and Mag running at 50ml/hr. BP stable, mag check completed. Pt with c/o drowsiness but denies HA and visual disturbances. Baby girl in arms at bedside, spoke with nursery RN and patient about keeping baby in basinet while  is not in room and pt is sleeping. Pt verbalized understanding. Remberto sent to [place]: [address] or Facility: [facility] From: Chelsea Ames RE: Elier Dave 1980 RM: [phone] Hey! She has about 400ml left of the magnesium bag. Her BP and mag checks are stable. Do you want us to discontinue it now? Cheryl Need Callback: NO CALLBACK REQ L & D    1020 TOWRB to dc mag per Dr. Hussain Tidwell. Mag off at 1023.    1400 Pt voided post thao removal, 300ml    1500 Preparing pt for transfer to MIU    1505 TRANSFER - OUT REPORT:    Verbal report given to Wilfrid Schilder RN(name) on [employer]  being transferred to MIU (unit) for routine progression of care       Report consisted of patients Situation, Background, Assessment and   Recommendations(SBAR). Information from the following report(s) SBAR, Intake/Output, MAR, Recent Results, and Med Rec Status was reviewed with the receiving nurse. Lines:   Peripheral IV 08/18/22 Posterior;Right Hand (Active)   Site Assessment Clean, dry, & intact 08/19/22 0717   Phlebitis Assessment 0 08/19/22 0717   Infiltration Assessment 0 08/19/22 0717   Dressing Status Clean, dry, & intact 08/19/22 0717   Dressing Type Tape;Transparent 08/19/22 0717   Hub Color/Line Status Pink; Infusing 08/19/22 0717   Action Taken Catheter repositioned 08/18/22 1926   Alcohol Cap Used Yes 08/18/22 1926        Opportunity for questions and clarification was provided.       Patient transported with:   Registered Nurse  Baby bands and fundus confirmed with receiving RN

## 2022-08-19 NOTE — PROGRESS NOTES
PostPartum Note    Bryan Batista  [id number]  1980  38 y.o.    S:  Ms. Bryan Batista is a 38 y.o. G17 [id number] PPD #1 s/p  @ 37w1d. Doing well. She had a baby girl. Her lochia is like a period. She describes her pain as mild and is well controlled with PO medications. She is breast feeding and this is going well. O:   Visit Vitals  /71   Pulse 100   Temp 98.4 °F (36.9 °C)   Resp 16   Ht 5' 3\" (1.6 m)   Wt 62.1 kg (137 lb)   SpO2 98%   Breastfeeding Unknown   BMI 24.27 kg/m²     Vitals:    22 0317 22 0417 22 0517 22 0617   BP: 121/73 116/70 119/78 132/71   Pulse: (!) 110 (!) 107 (!) 108 100   Resp: 16 14 16 16   Temp:  98.4 °F (36.9 °C)     SpO2: 100% 95%  98%   Weight:       Height:             Lab Results   Component Value Date/Time    WBC 8.0 2022 07:27 AM    HGB 10.6 (L) 2022 07:27 AM    HCT 33.6 (L) 2022 07:27 AM    PLATELET 841  07:27 AM    MCV 78.1 (L) 2022 07:27 AM    Hgb, External 10.1 02/15/2016 12:00 AM    Hct, External 32.1 02/15/2016 12:00 AM    Platelet cnt., External 255 02/15/2016 12:00 AM       Gen - No acute distress  Abdomen - Fundus firm, below the umbilicus   Ext - Warm, well perfused. Nontender    A/P:  PPD #1 s/p  @ 37w1d doing well. 1.  Routine PP instructions/ care discussed - plan to continue magnesium x24h PP   2. Blood type - Rh +  3. Rubella imm  4. Circumcision n/a   5. Discharge PPD#2   6. F/U 4-6 weeks for PP check.       Rebekah Alarcon MD  [facility] 

## 2022-08-19 NOTE — PROGRESS NOTES
2107 Patient actively pushing. RN remains in continuous attendance at the bedside. Assessment & evaluation of fetal heart rate ongoing via continuous EFM. 2114 RN remained at bedside throughout pushing. EFM continuously assessed. Vaginal delivery of viable infant.

## 2022-08-19 NOTE — PROGRESS NOTES
2052 Dr Hazel Morrow at bedside, pt is completely dilated. Pt's support person out of hospital, called back at this time. Per MD, defer pushing until FOB arrives. 2055 This RN removed thao cath, , slightly blood tinged. 2120 Placenta expressed. Fundus firm at U. QBL at delivery 50mL.

## 2022-08-19 NOTE — LACTATION NOTE
This note was copied from a baby's chart. Infant nursing well per mom. LC stopping by for routine round/visit. LC did have company but did encourage that she continue nursing infant on demand. Normal weight loss and diaper output reviewed. Breastfeeding booklet provided. Reviewed breastfeeding basics:  How milk is made and normal  breastfeeding behaviors discussed. Supply and demand,  stomach size, early feeding cues, skin to skin bonding with comfortable positioning and baby led latch-on reviewed. How to identify signs of successful breastfeeding sessions reviewed; education on asymetrical latch, signs of effective latching vs shallow, in-effective latching, normal  feeding frequency and duration and expected infant output discussed. Normal course of breastfeeding discussed including the AAP's recommendation that children receive exclusive breast milk feedings for the first six months of life with breast milk feedings to continue through the first year of life and/or beyond as complimentary table foods are added. Breastfeeding Booklet and Warm line information provided with discussion. Discussed typical  weight loss and the importance of pediatrician appointment within 24-48 hours of discharge, at 2 weeks of life and normalcy of requesting pediatric weight checks as needed in between visits. Pt will successfully establish breastfeeding by feeding in response to early feeding cues   or wake every 3h, will obtain deep latch, and will keep log of feedings/output. Taught to BF at hunger cues and or q 2-3 hrs and to offer 10-20 drops of hand expressed colostrum at any non-feeds.          Breast- Feeding Assessment  Attends Breast-Feeding Classes: No  Breast-Feeding Experience: Yes  Breast Trauma/Surgery: No  Type/Quality: Good        LATCH Documentation  Latch: Grasps breast, tongue down, lips flanged, rhythmic sucking  Audible Swallowing: A few with stimulation  Type of Nipple: Everted (after stimulation)  Comfort (Breast/Nipple): Soft/non-tender  Hold (Positioning): Full assist, teach one side, mother does other, staff holds  [facility] Score: 8    Discussed with mother her plan for feeding. Reviewed the benefits of exclusive breast milk feeding during the hospital stay. Informed mother of the risks of using formula to supplement in the first few days of life as well as the benefits of successful breast milk feeding; referred mother to the handout in her admission packet related to these topics. Mother acknowledges understanding of information reviewed and states that it is her plan tobreast milk feed exclusively her infant. Will support her choice and offer additional information as needed.

## 2022-08-19 NOTE — L&D DELIVERY NOTE
Delivery Summary    Delivery Note:     Patient reached FD and pushed with good effort to deliver the fetal head in direct OP position. Loose nuchal cord x1 found and reduced. The anterior shoulder, followed by the posterior shoulder and the rest of the body then delivered easily. This was a viable female infant with Apgars of 9 and 9 at 1 and 5 minutes respectively, weight pending. The infant was placed on mom's abdomen. The cord was then double clamped and cut by the FOB. Cord blood was taken. The placenta followed spontaneously, intact, with 3VC. Pitocin was added to the IVF and the fundus was firm to palpation. The vagina, cervix and perineum were examined and no laceration was found.  mL. No complications. Mom and baby doing well. Dr. Martínez Green delivering. Oanh Walsh MD  [facility]      Patient: Rosemary Cadet MRN: [id number]  SSN: [id number]    YOB: 1980  Age: 38 y.o. Sex: female       Information for the patient's :  Rossy Jones [[id number]]     Labor Events:    Labor:      Steroids:     Cervical Ripening Date/Time:       Cervical Ripening Type:     Antibiotics During Labor:     Rupture Identifier:      Rupture Date/Time: 2022 12:33 PM   Rupture Type: AROM   Amniotic Fluid Volume:      Amniotic Fluid Description: Blood stained    Amniotic Fluid Odor: None    Induction:         Induction Date/Time:        Indications for Induction:      Augmentation:     Augmentation Date/Time:      Indications for Augmentation:     Labor complications:          Additional complications:        Delivery Events:  Indications For Episiotomy:     Episiotomy: None   Perineal Laceration(s): None   Repaired:     Periurethral Laceration Location:      Repaired:     Labial Laceration Location:     Repaired:     Sulcal Laceration Location:     Repaired:     Vaginal Laceration Location:     Repaired:     Cervical Laceration Location: Repaired:     Repair Suture: None   Number of Repair Packets:     Estimated Blood Loss (ml):  ml   Quantitative Blood Loss (ml)                Delivery Date: 2022    Delivery Time: 9:14 PM  Delivery Type: Vaginal, Spontaneous  Sex:  Female    Gestational Age: 42w4d   Delivery Clinician:  Aly Fiore  Living Status: Living   Delivery Location: L&D            APGARS  One minute Five minutes Ten minutes   Skin color:            Heart rate:            Grimace:            Muscle tone:            Breathing: Totals:                Presentation: Vertex    Position:   Occiput Posterior  Resuscitation Method:  Suctioning-bulb; Tactile Stimulation     Meconium Stained: Terminal      Cord Information: 3 Vessels  Complications:    Cord around:    Delayed cord clamping? Yes  Cord clamped date/time:2022  9:15 PM  Disposition of Cord Blood: Discard    Blood Gases Sent?: No    Placenta:  Date/Time: 2022  9:20 PM  Removal: Spontaneous      Appearance: Normal     [place] Measurements:  Birth Weight:        Birth Length:        Head Circumference:        Chest Circumference:       Abdominal Girth: Other Providers:   KYMBERLY Monique;MELISSA ZAYAS;MYRIAM LYNCH, Obstetrician;Primary Nurse;Primary [place] Nurse;Staff Nurse;Charge Nurse         Group B Strep:   Lab Results   Component Value Date/Time    GrBStrep, External Negative 04/15/2016 12:00 AM     Information for the patient's :  Rubbie Closs Female Haleigh Duff [[id number]]   No results found for: ABORH, PCTABR, PCTDIG, BILI, ABORHEXT, ABORH   No results for input(s): PCO2CB, PO2CB, HCO3I, SO2I, IBD, PTEMPI, SPECTI, PHICB, ISITE, IDEV, IALLEN in the last 72 hours.

## 2022-08-19 NOTE — DISCHARGE INSTRUCTIONS
Discharge Instructions for Vaginal Delivery    Patient ID:  Sherley Ovalle  [id number]  38 y.o.  1980    Take Home Medications       Continue taking your prenatal vitamins if you are breastfeeding. Follow-up care is a key part of your treatment and safety. Please schedule and keep appointments. Follow-up with your primary OB in 6 weeks. Activity  Avoid anything in your vagina for 6 weeks (no intercourse, tampons, or douching). You may drive unless you are taking prescription pain medications. Climbing stairs and light lifting are okay. Please avoid excessive exercise, though walking is okay- you'll be tired! Diet  Regular diet as tolerated. Be sure to drink plenty of fluids if you are breastfeeding. Wound care  If you have stitches, continue to rinse with a squirt bottle of warm water each time you void for about 7-10 days. .  Your stitches will gradually dissolve over four to eight weeks. Sitz baths are also helpful to keep the wound clean, encourage healing, and to help with pain associated with the stitches or hemorrhoids. You can use either a sitz bath basin or a bathtub filled with 2-3\" inches of plain warm water. Soak for 10 minutes 3 times a day as tolerated. Pain Management  Over the counter medications such as Tylenol and ibuprofen (Motrin or Advil) are ideal.  These may be taken together, alternating doses. You may  take the maximum dose:  Motrin or Advil (generic ibuprofen), either 3 tablets every 6 hours or 4 tablets every 8 hours or Tylenol (acetominophen) 1000mg every 6 hours (equivalent to 2 extra strength Tylenol). You may also have a precrescription for stronger pain medication. Take only as needed and transition to over the counter medication in the next few days. Minimize amounts of the prescription medication, as it can be habit-forming and will worsen or cause constipation.  Most patients will find that within a couple of days, their pain is adequately controlled using only over-the-counter medications. The prescription pain medication is mixed with Tylenol, therefore, you should not take any extra Tylenol or acetaminophen until you have reduced your prescription pain medication. Add heating pad or sitz baths as needed. Add hemorrhoid wipes or ointments if needed    Constipation  Constipation is normal after pregnancy and delivery, especially while taking prescription narcotic pain medication. Over the counter remedies including ducosate (Colace), take 1-2 capsules 1-2 times daily for soft stool as needed. You may also add/ try milk of magnesia or rectal remedies such as Dulcolax or Fleets enema. Recovery: What to Expect at Home  Fatigue is expected. Try to rest when you can and don't worry about doing housework or other tasks which can wait. The soreness along your bottom will improve significantly over the first 2 weeks, but it may take 6 weeks before you are completely recovered. Back pain or general body aches or muscle soreness are expected and should improve with acetominophen or ibuprofen. Leg swelling due to pregnancy and/or IV fluids given in the hospital will take about two weeks to resolve. Most women experience some form of the \"Baby Blues\" after having a baby. Feeling emotional, tearful, frustrated, anxious, sad, and irritable some of the time is normal and go away after about 2 weeks. Adequate rest and help from your family will help. Take breaks from caring for the baby. Call your doctor if your symptoms seem severe, last more than 2 weeks, or seem to be getting worse instead of better. Get help immediately if you have thoughts of wanting to hurt yourself or others! Call your doctor or seek immediate medical care if you have:  Heavy vaginal bleeding, soaking through one or more pads an hour for several hours. Foul-smelling discharge from your vagina or incision.   Consistent nausea and vomiting and cannot keep fluids down.  Consistent pain that does not get better after you take pain medicine.   Sudden chest pain and shortness of breath  Signs of a blood clot: pain/ swelling/ increasing redness in your lower extremeties  Signs of infection: increased pain in your abdomen or vaginal area; red streaks, warmth, or tenderness of your breasts; fever of 100.5 F or greater

## 2022-08-19 NOTE — PROGRESS NOTES
Problem: Vaginal Delivery: Postpartum Day 1  Goal: Activity/Safety  Outcome: Progressing Towards Goal  Goal: Consults, if ordered  Outcome: Progressing Towards Goal  Goal: Diagnostic Test/Procedures  Outcome: Progressing Towards Goal     Problem: Falls - Risk of  Goal: *Absence of Falls  Description: Document Nilesh Fall Risk and appropriate interventions in the flowsheet.   Outcome: Progressing Towards Goal  Note: Fall Risk Interventions:  Mobility Interventions: Patient to call before getting OOB                             Problem: Patient Education: Go to Patient Education Activity  Goal: Patient/Family Education  Outcome: Progressing Towards Goal

## 2022-08-19 NOTE — PROGRESS NOTES
Received report via telephone from L&D RN. Patient transported via wheelchair in stable condition. Patient is s/p Mg X12 hours. Patient is ambulating in room with SO at bedside. Breastfeeding without difficulty. BP WNL. See full assessment in flowsheets.       Problem: Patient Education: Go to Patient Education Activity  Goal: Patient/Family Education  Outcome: Progressing Towards Goal     Problem: Vaginal Delivery: Day of Delivery-Post delivery  Goal: Off Pathway (Use only if patient is Off Pathway)  Outcome: Progressing Towards Goal  Goal: Activity/Safety  Outcome: Progressing Towards Goal  Goal: Consults, if ordered  Outcome: Progressing Towards Goal  Goal: Nutrition/Diet  Outcome: Progressing Towards Goal  Goal: Discharge Planning  Outcome: Progressing Towards Goal  Goal: Medications  Outcome: Progressing Towards Goal  Goal: Treatments/Interventions/Procedures  Outcome: Progressing Towards Goal  Goal: *Vital signs within defined limits  Outcome: Progressing Towards Goal  Goal: *Labs within defined limits  Outcome: Progressing Towards Goal  Goal: *Hemodynamically stable  Outcome: Progressing Towards Goal  Goal: *Optimal pain control at patient's stated goal  Outcome: Progressing Towards Goal  Goal: *Participates in infant care  Outcome: Progressing Towards Goal  Goal: *Demonstrates progressive activity  Outcome: Progressing Towards Goal  Goal: *Tolerating diet  Outcome: Progressing Towards Goal     Problem: Vaginal Delivery: Postpartum Day 1  Goal: Off Pathway (Use only if patient is Off Pathway)  Outcome: Progressing Towards Goal  Goal: Activity/Safety  Outcome: Progressing Towards Goal  Goal: Consults, if ordered  Outcome: Progressing Towards Goal  Goal: Diagnostic Test/Procedures  Outcome: Progressing Towards Goal  Goal: Nutrition/Diet  Outcome: Progressing Towards Goal  Goal: Discharge Planning  Outcome: Progressing Towards Goal  Goal: Medications  Outcome: Progressing Towards Goal  Goal: Treatments/Interventions/Procedures  Outcome: Progressing Towards Goal  Goal: Psychosocial  Outcome: Progressing Towards Goal  Goal: *Vital signs within defined limits  Outcome: Progressing Towards Goal  Goal: *Labs within defined limits  Outcome: Progressing Towards Goal  Goal: *Hemodynamically stable  Outcome: Progressing Towards Goal  Goal: *Optimal pain control at patient's stated goal  Outcome: Progressing Towards Goal  Goal: *Participates in infant care  Outcome: Progressing Towards Goal  Goal: *Demonstrates progressive activity  Outcome: Progressing Towards Goal  Goal: *Performs self perineal care  Outcome: Progressing Towards Goal  Goal: *Appropriate parent-infant bonding  Outcome: Progressing Towards Goal  Goal: *Tolerating diet  Outcome: Progressing Towards Goal  Goal: *Performs self breast care  Outcome: Progressing Towards Goal     Problem: Vaginal Delivery: Postpartum 2  Goal: Off Pathway (Use only if patient is Off Pathway)  Outcome: Progressing Towards Goal  Goal: Activity/Safety  Outcome: Progressing Towards Goal  Goal: Consults, if ordered  Outcome: Progressing Towards Goal  Goal: Nutrition/Diet  Outcome: Progressing Towards Goal  Goal: Discharge Planning  Outcome: Progressing Towards Goal  Goal: Medications  Outcome: Progressing Towards Goal  Goal: Treatments/Interventions/Procedures  Outcome: Progressing Towards Goal  Goal: Psychosocial  Outcome: Progressing Towards Goal     Problem: Vaginal Delivery: Discharge Outcomes  Goal: *Verbalizes name, dosage, time, side effects, and number of days to continue medications  Outcome: Progressing Towards Goal  Goal: *Describes available resources and support systems  Outcome: Progressing Towards Goal  Goal: *No signs and symptoms of infection  Outcome: Progressing Towards Goal  Goal: *Birth certificate information completed  Outcome: Progressing Towards Goal  Goal: *Received and verbalizes understanding of discharge plan and instructions  Outcome: Progressing Towards Goal  Goal: *Vital signs within defined limits  Outcome: Progressing Towards Goal  Goal: *Labs within defined limits  Outcome: Progressing Towards Goal  Goal: *Hemodynamically stable  Outcome: Progressing Towards Goal  Goal: *Optimal pain control at patient's stated goal  Outcome: Progressing Towards Goal  Goal: *Participates in infant care  Outcome: Progressing Towards Goal  Goal: *Demonstrates progressive activity  Outcome: Progressing Towards Goal  Goal: *Appropriate parent-infant bonding  Outcome: Progressing Towards Goal  Goal: *Tolerating diet  Outcome: Progressing Towards Goal     Problem: Falls - Risk of  Goal: *Absence of Falls  Description: Document Nilesh Fall Risk and appropriate interventions in the flowsheet.   Outcome: Progressing Towards Goal  Note: Fall Risk Interventions:  Mobility Interventions: Patient to call before getting OOB                             Problem: Patient Education: Go to Patient Education Activity  Goal: Patient/Family Education  Outcome: Progressing Towards Goal

## 2022-08-19 NOTE — DISCHARGE SUMMARY
Obstetrical Discharge Summary     Name: Taina Prasad MRN: [id number]  SSN: [id number]    YOB: 1980  Age: 38 y.o. Sex: female      Admit Date: 2022    Discharge Date: 22     Attending Physician:  Alejandro Alfonso DO     Delivering Physician:  Saumya Flores MD     * Admission Diagnoses:   IUP @ 37w1d   Preeclampsia with severe features      * Discharge Diagnoses:   Delivery of a VFI via  by Saumya Flores MD on 2022. Apgars were 9 and 9. Preelcampsia with severe features      Additional Diagnoses:   Hospital Problems as of 2022 Date Reviewed: 2022            Codes Class Noted - Resolved POA    Severe pre-eclampsia affecting childbirth ICD-10-CM: O14.14  ICD-9-CM: 642.53  2022 - Present Yes        37 weeks gestation of pregnancy ICD-10-CM: Z3A.37  ICD-9-CM: V22.2  2022 - Present Yes          Lab Results   Component Value Date/Time    Rubella, External Immune 2022 12:00 AM    GrBStrep, External Negative 2022 12:00 AM      Immunization History   Administered Date(s) Administered    Tdap 2014       * Procedures:            * Discharge Condition: good    * Hospital Course: Normal hospital course following the delivery. * Disposition: Home    Discharge Medications:   Current Discharge Medication List        START taking these medications    Details   NIFEdipine ER (PROCARDIA XL) 30 mg ER tablet Take 1 Tablet by mouth every twelve (12) hours for 15 days. Qty: 30 Tablet, Refills: 0  Start date: 2022, End date: 2022           CONTINUE these medications which have NOT CHANGED    Details   prenatal multivit-ca-min-fe-fa tab Take  by mouth. STOP taking these medications       aspirin 81 mg chewable tablet Comments:   Reason for Stopping:               * Follow-up Care/Patient Instructions:   Activity: Activity as tolerated  Diet: Regular Diet  Wound Care: As directed      Followup 1-2 weeks for BP check, 6 weeks for PP check        Signed By:  Aletha Marcus MD     August 20, 2022

## 2022-08-20 VITALS
HEIGHT: 63 IN | BODY MASS INDEX: 24.27 KG/M2 | RESPIRATION RATE: 16 BRPM | OXYGEN SATURATION: 99 % | TEMPERATURE: 98 F | SYSTOLIC BLOOD PRESSURE: 140 MMHG | HEART RATE: 88 BPM | DIASTOLIC BLOOD PRESSURE: 80 MMHG | WEIGHT: 137 LBS

## 2022-08-20 PROCEDURE — 74011250637 HC RX REV CODE- 250/637: Performed by: STUDENT IN AN ORGANIZED HEALTH CARE EDUCATION/TRAINING PROGRAM

## 2022-08-20 PROCEDURE — 74011250637 HC RX REV CODE- 250/637: Performed by: OBSTETRICS & GYNECOLOGY

## 2022-08-20 RX ORDER — NIFEDIPINE 30 MG/1
30 TABLET, EXTENDED RELEASE ORAL EVERY 12 HOURS
Qty: 30 TABLET | Refills: 0 | Status: SHIPPED | OUTPATIENT
Start: 2022-08-20 | End: 2022-09-04

## 2022-08-20 RX ADMIN — IBUPROFEN 800 MG: 800 TABLET, FILM COATED ORAL at 09:04

## 2022-08-20 RX ADMIN — ACETAMINOPHEN 650 MG: 325 TABLET ORAL at 09:04

## 2022-08-20 RX ADMIN — IBUPROFEN 800 MG: 800 TABLET, FILM COATED ORAL at 02:04

## 2022-08-20 RX ADMIN — NIFEDIPINE 30 MG: 30 TABLET, FILM COATED, EXTENDED RELEASE ORAL at 09:04

## 2022-08-20 NOTE — PROGRESS NOTES
1318: Patient discharged to home. Discharge instructions and education completed and patient reported she had no more questions. Bands verified on patient and infant, see footprint sheet. Infant placed in car seat by parent. Patient instructed by Dr. Jimbo Avendano to schedule a 1 week follow up for BP check. Patient states she has a BP cuff at home and verbalizes understanding.

## 2022-08-20 NOTE — PROGRESS NOTES
Post-Partum Day Number 2 Progress Note    Patient doing well post-partum without significant complaints. Voiding without difficulty, normal lochia. Vitals:  Patient Vitals for the past 24 hrs:   BP Temp Pulse Resp SpO2   22 0831 134/76 98.1 °F (36.7 °C) 86 16 97 %   22 0357 (!) 146/81 98.4 °F (36.9 °C) 87 16 96 %   22 0010 (!) 141/76 99 °F (37.2 °C) 81 16 96 %   22 2054 138/85 98.8 °F (37.1 °C) 93 16 98 %   22 1950 (!) 143/78 98.8 °F (37.1 °C) 99 16 98 %   22 1515 136/83 98.4 °F (36.9 °C) 95 16 98 %   22 1317 122/73 -- 100 16 --   22 1314 -- -- -- -- 97 %   22 1219 -- -- -- -- 97 %   22 1217 118/71 -- (!) 101 16 --   22 1119 -- -- -- -- 96 %   22 1117 121/71 -- (!) 102 16 --   22 1017 119/65 -- 97 16 --   22 1014 -- -- -- -- 97 %     Temp (24hrs), Av.6 °F (37 °C), Min:98.1 °F (36.7 °C), Max:99 °F (37.2 °C)      Vital signs stable, afebrile. Exam:     Feeding: breast    Patient without distress. Abdomen soft, fundus firm, nontender               Lower extremities- nontender without edema; no cords    Labs: No results found for this or any previous visit (from the past 24 hour(s)). Assessment and Plan:  Patient appears to be having uncomplicated post-partum course. Discharge today-instructions reviewed. B POSITIVE Declined meds.  Rx Procardia